# Patient Record
(demographics unavailable — no encounter records)

---

## 2024-12-11 NOTE — ASSESSMENT
[FreeTextEntry1] : 69-year-old male presents for evaluation of possible support keratosis of his left temple.  Patient interested in getting removed.  Explained to the patient could do local in my office Versus in the hospital ambulatory center with local and/or sedation.  Patient would like to proceed with scheduling in the amatory center with straight local.  Explained to patient risks and benefits of procedure explained to the patient recovery process will book the surgery.

## 2024-12-11 NOTE — HISTORY OF PRESENT ILLNESS
[FreeTextEntry1] : 69-year-old patient presents to my office for evaluation of left temple soft tissue mass.  Patient states she has had it for multiple years now patient says it is grown.  Patient is concerned.

## 2024-12-30 NOTE — HISTORY OF PRESENT ILLNESS
[FreeTextEntry1] : 69-year-old patient presents to my office for evaluation of left temple soft tissue mass.  Patient states she has had it for multiple years now patient says it is grown.  Patient is concerned. Now POD # 11 s/p Excision Soft Tissue Mass of Left [place], doing well, no complaints.

## 2024-12-30 NOTE — PHYSICAL EXAM
[de-identified] : well nourished, well developed, NAD [de-identified] : left temporal incision c/d/i with sutures in place.  Well healed.  no erythema, swelling, ecchymosis or drainage.

## 2024-12-30 NOTE — ASSESSMENT
[FreeTextEntry1] : 69-year-old male presents for evaluation of possible support keratosis of his left temple.  now POD #11 s/p excision of soft tissue mass of left temple.  - sutures removed  - daily aquaphor x 1 week then start scar creams as discussed - pathology reviewed - actinic keratosis with clear margin - follow up only if needed with Dr. Del Toro for scar check.

## 2024-12-30 NOTE — DATA REVIEWED
[FreeTextEntry1] : [facility] Accession Number : [id number] Patient:     ERIC LARA   Accession:                             [id number]  Collected Date/Time:                   12/19/2024 12:45 EST Received Date/Time:                    12/19/2024 13:49 EST  Surgical Pathology Report - Auth (Verified)  Specimen(s) Submitted Left temple soft tissue mass  Final Diagnosis Left  temporal soft tissue mass, excision: -Actinic keratosis. -Excision margin appearing free of atypical change.  Verified by: Antoni Worthy M.D. (Electronic Signature) Reported on: 12/20/24 18:43 EST, [facility], [address] [place] [address] Phone: [phone]   Fax: [phone] _________________________________________________________________

## 2025-03-27 NOTE — HISTORY OF PRESENT ILLNESS
[Initial Evaluation] : an initial evaluation of [Excessive Daytime Sleepiness] : excessive daytime sleepiness [Witnessed Apnea During Sleep] : witnessed apnea during sleep [Witnessed Gasping During Sleep] : witnessed gasping during sleep [Snoring] : snoring [Unrefreshing Sleep] : unrefreshing sleep [Sleepy When Sedentary] : sleepy when sedentary [Currently Experiencing] : The patient is currently experiencing symptoms. [Shortness of Breath] : no shortness of breath [Obesity] : obesity

## 2025-03-27 NOTE — PLAN
[TextEntry] : will proceed with split  sleep study  counseled for weight reduction  told not to drive if feel tired  counseled for sleep hygiene

## 2025-03-27 NOTE — REASON FOR VISIT
[Initial] : an initial visit [Sleep Apnea] : sleep apnea [TextEntry] : This is a 70-year-old male with history of RAQUEL diagnosed more than 10 years ago presented for evaluation follow-up. Dr. Crabtree has history of sleep apnea and been on CPAP machine more than 10 years as per patient, he has all the lower symptom of RAQUEL ,he do complain of daytime fatigue and somnolence also complain of snoring at night sometimes wake up choking, dry mouth in am  The current machine that he has is more than 10 years old